# Patient Record
(demographics unavailable — no encounter records)

---

## 2025-01-14 NOTE — ADDENDUM
[FreeTextEntry1] :  ROSEANN ROWE, am scribing for and in the presence of  in the following sections: HISTORY OF PRESENT ILLNESS, PAST MEDICAL/FAMILY/SOCIAL HISTORY, REVIEW OF SYSTEMS, VITAL SIGNS, PHYSICAL EXAM, ASSESSMENT/PLAN on 01/06/2025.

## 2025-01-14 NOTE — ASSESSMENT
[FreeTextEntry1] : 38 year old male who desires selective sterilization for family planning purposes. We discussed that this is essentially an irreversible procedure. We also discussed the risks, benefits, and expected outcomes of the procedure. He understands that risks may include bleeding, infection, damage to adjacent organs, and chronic discomfort. The patient understands that he should have 2 semen analyses at 2 and 3 months--if these show no sperm present, then he may proceed with unprotected intercourse. The patient understands that the risk of recanalization is 1 in 2000. Until then, he should use appropriate protection. Additionally, we discussed some of the older data about possible increased risk for prostate cancer--the current thought is for him to be screened for prostate cancer starting at age 50 as he does not have a family hx of prostate cancer. A [facility] specific sterilization consent form has been obtained. The patient would like to be scheduled for sedation and we will make those arrangements after 1 month per [place] rules. All his questions were otherwise answered. Pt would like to be done in the operating room. Total time=45 min.   The submitted E/M billing level for this visit reflects the total time spent on the day of the visit including face-to-face time spent with the patient, non-face-to-face review of medical records and relevant information, documentation, and asynchronous communication with the patient after a visit via phone, email, or patients EHR portal after the visit. The medical records reviewed are either scanned into the chart or reviewed with the patient using a patients electronic medical records portal for patients with records not available to [facility] via electronic transmission platforms from other institutions and labs. Time spent counseling and performing coordination of care was also included in determining the appropriate EM billing level.   I have reviewed and verified information regarding the chief complaint and history recorded by the ancillary staff and/or the patient. I have independently reviewed and interpreted tests performed by other physicians and facilities as necessary.   I have discussed with the patient differential diagnosis, reason for auxiliary tests if ordered, risks, benefits, alternatives, and complications of each form of therapy were discussed.  I personally performed the services described in the documentation, reviewed the documentation recorded by the scribe in my presence, and it accurately and completely records my words and actions.

## 2025-01-14 NOTE — ASSESSMENT
[FreeTextEntry1] : 38 year old male who desires selective sterilization for family planning purposes. We discussed that this is essentially an irreversible procedure. We also discussed the risks, benefits, and expected outcomes of the procedure. He understands that risks may include bleeding, infection, damage to adjacent organs, and chronic discomfort. The patient understands that he should have 2 semen analyses at 2 and 3 months--if these show no sperm present, then he may proceed with unprotected intercourse. The patient understands that the risk of recanalization is 1 in 2000. Until then, he should use appropriate protection. Additionally, we discussed some of the older data about possible increased risk for prostate cancer--the current thought is for him to be screened for prostate cancer starting at age 50 as he does not have a family hx of prostate cancer. A [facility] [place] specific sterilization consent form has been obtained. The patient would like to be scheduled for sedation and we will make those arrangements after 1 month per [place] rules. All his questions were otherwise answered. Pt would like to be done in the operating room. Total time=45 min.   The submitted E/M billing level for this visit reflects the total time spent on the day of the visit including face-to-face time spent with the patient, non-face-to-face review of medical records and relevant information, documentation, and asynchronous communication with the patient after a visit via phone, email, or patients EHR portal after the visit. The medical records reviewed are either scanned into the chart or reviewed with the patient using a patients electronic medical records portal for patients with records not available to [facility] via electronic transmission platforms from other institutions and labs. Time spent counseling and performing coordination of care was also included in determining the appropriate EM billing level.   I have reviewed and verified information regarding the chief complaint and history recorded by the ancillary staff and/or the patient. I have independently reviewed and interpreted tests performed by other physicians and facilities as necessary.   I have discussed with the patient differential diagnosis, reason for auxiliary tests if ordered, risks, benefits, alternatives, and complications of each form of therapy were discussed.  I personally performed the services described in the documentation, reviewed the documentation recorded by the scribe in my presence, and it accurately and completely records my words and actions.

## 2025-01-14 NOTE — PHYSICAL EXAM
[General Appearance - Well Developed] : well developed [General Appearance - In No Acute Distress] : no acute distress [Oriented To Time, Place, And Person] : oriented to person, place, and time [Testes Mass (___cm)] : there were no testicular masses [de-identified] : bilateral VAS palpable, perhaps a small right varicocele

## 2025-01-14 NOTE — HISTORY OF PRESENT ILLNESS
[FreeTextEntry1] :  38 year old male with 2 children who desires elective sterilization by vasectomy for family planning purposes. The pt and his wife have discussed that they do not want any further children and they both agree. He denies any voiding issues. He denies a prior history of UTIs, dysuria, hematuria, or stone disease.  He complains of occasional nocturia, but it sounds more related to him getting up for his kids.   PMHx: 1 episode of prostatitis, allergy issues PSHx: MNTs as a child, tonsillectomy ALG:  No known drug allergies MEDS: Allergy-related medications FHx: - prostate cancer SHx: Denies tobacco use; social alcohol use; works as an  for the city

## 2025-01-14 NOTE — PHYSICAL EXAM
[General Appearance - Well Developed] : well developed [General Appearance - In No Acute Distress] : no acute distress [Oriented To Time, Place, And Person] : oriented to person, place, and time [Testes Mass (___cm)] : there were no testicular masses [de-identified] : bilateral VAS palpable, perhaps a small right varicocele

## 2025-03-20 NOTE — ASSESSMENT
[FreeTextEntry1] : 38-year-old status post bilateral vasectomy with Dr. Kerns. Doing well. No signs of infection on physical exam.  Patient understands his current fertility status. He will continue to use contraception prevent pregnancy until cleared with postvasectomy semen analysis negative x 2. Orders placed. He will call office to review results.  Return to office and ER precautions outlined patient verbalized understanding.  Return to office as needed

## 2025-03-20 NOTE — HISTORY OF PRESENT ILLNESS
[FreeTextEntry1] : Lorenzo is a 38-year-old status post bilateral vasectomy with Dr. Kerns. Presents to office for follow-up. Reports feeling well.  Has some discomfort on the left side.  No severe pains.  No fevers.  No swelling.  Pathology shows full cross-section of vas deferens bilaterally.